# Patient Record
Sex: FEMALE | Race: WHITE | NOT HISPANIC OR LATINO | Employment: FULL TIME | URBAN - METROPOLITAN AREA
[De-identification: names, ages, dates, MRNs, and addresses within clinical notes are randomized per-mention and may not be internally consistent; named-entity substitution may affect disease eponyms.]

---

## 2024-09-13 ENCOUNTER — OFFICE VISIT (OUTPATIENT)
Dept: ORTHOPEDIC SURGERY | Facility: CLINIC | Age: 39
End: 2024-09-13
Payer: COMMERCIAL

## 2024-09-13 VITALS — WEIGHT: 123.6 LBS | TEMPERATURE: 97.8 F | BODY MASS INDEX: 22.74 KG/M2 | HEIGHT: 62 IN

## 2024-09-13 DIAGNOSIS — R52 PAIN: ICD-10-CM

## 2024-09-13 DIAGNOSIS — M54.12 CERVICAL RADICULOPATHY: Primary | ICD-10-CM

## 2024-09-13 RX ORDER — AMPHETAMINE 15.7 MG/1
TABLET, ORALLY DISINTEGRATING ORAL
COMMUNITY
Start: 2024-08-28

## 2024-09-13 NOTE — PROGRESS NOTES
Patient Name: Armando Parra   YOB: 1985  Referring Primary Care Physician: Jen Zaragoza APRN      Chief Complaint:    Chief Complaint   Patient presents with    Cervical Spine - Initial Evaluation, Pain        Previous Treatment:     PT for neck/shoulder pain? Yes    Effective? No    MRI of C-Spine? No         Neck Pain   This is a chronic problem. The current episode started more than 1 year ago. The problem occurs constantly. The problem has been gradually worsening. The pain is associated with nothing. The pain is present in the right side and occipital region (Rt shoulder). The pain is at a severity of 4/10. The pain is moderate. Nothing aggravates the symptoms. Associated symptoms include numbness, tingling and weakness. She has tried chiropractic manipulation for the symptoms. The treatment provided mild relief.        HPI:  Armando Parra is a 39 y.o. female who presents to [facility] for evaluation of above complaints.  Primarily having pain in the right side of her neck into the shoulder occasionally into the triceps as well.  Also has paresthesia in the entire right hand.  She is right-hand dominant.  She was previously evaluated by an orthopedist for right shoulder pain and felt to have biceps tendinopathy.  She did complete a round of physical therapy for her neck and shoulder.  Unfortunately symptoms have progressed over the years.  Also has secondary complaint of low back pain referring down the right lower extremity.  Denies any associated injury with the neck or back.  She does have a sense of imbalance but feels like she is just generally incoordinated.  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  This is a new patient to the practice and new to me.  She is unaccompanied today.  Prior pertinent records were reviewed.    PFSH:  See attached    ROS: As per HPI, otherwise  negative    Objective:      39 y.o. female  Body mass index is 22.61 kg/m²., 56.1 kg (123 lb 9.6 oz), @HT@  Vitals:    09/13/24 0909   Temp: 97.8 °F (36.6 °C)     Pain Score    09/13/24 0909   PainSc:   4   PainLoc: Neck            Spine Musculoskeletal Exam    Gait    Gait is normal.    Inspection    Coronal balance: no imbalance    Sagittal balance: no imbalance    Palpation    Cervical Spine    Right      Muscle tone: normal    Left      Muscle tone: normal    Strength    Cervical Spine    Cervical spine motor exam is normal.    Sensory    Cervical Spine    Cervical spine sensation is normal.    Reflexes    Right        Biceps: 2/4      Brachioradialis: 2/4      Triceps: 2/4      Puga: present (very subtle)    Left        Biceps: 2/4        Brachioradialis: 2/4      Triceps: 2/4      Puga: present        IMAGING:     Indication: pain related symptoms,  Views: 2V AP&LAT cervical    Findings: straightening of lordosis, disc space settling worse at C5C6  Comparison views: None for direct comparison but report in 2018 reported as normal    Assessment:           Diagnoses and all orders for this visit:    1. Cervical radiculopathy (Primary)  -     MRI Cervical Spine Without Contrast; Future    2. Pain  -     XR Spine Cervical 2 View             Plan:  She does not seem to have a radicular component when it comes to the right shoulder and arm.  She does have advanced degenerative change C5-C6 as well as subtle bilateral Robin's.  She has already tried a course of physical therapy but symptoms have progressed.  Will submit for cervical MRI and see her back afterwards to discuss the results and possibly workup the low back and right leg complaints at that point.  We did discuss the possibility of Erler's Danlos syndrome today, there is really no good treatment other than staying active and exercising.     Return for After radiographic tests.    EMR Dragon/Transcription Disclaimer:   Much of this encounter note  is an electronic transcription/translation of spoken language to printed text. The electronic translation of spoken language may permit erroneous, or at times, nonsensical words or phrases to be inadvertently transcribed; Although I have reviewed the note for such errors, some may still exist.  Red flags have been discussed at this or previous visits to include but not limited weakness in extremities, worsening pain that does not respond to conservative treatment and bowel or bladder dysfunction. These are reasons to present to ER and patient has been informed.    The diagnosis(es), natural history, pathophysiology and treatment for diagnosis(es) were discussed. Opportunity given and questions answered. Biomechanics of pertinent body areas discussed.    EXERCISES:  Advice on benefits of, and types of regular/moderate exercise pertaining to diagnosis.  Continue HEP. For back or neck pain, recommend pilates and or yoga as tolerated. Generally it is best to start any new exercise under the guidance of a  or therapist.   MEDICATIONS:  When prescribe, the risks, benefits, warnings,side effects and alternatives of medications discussed. Advised against long term use of narcotics.   PAIN CONTROL:  Cold, heat, OTC lidocaine patches and/or ointment as needed. Avoid direct skin contact with ice. Ice 15-20 minutes 3-4 times daily as needed. For SI joint pain, recommend ice bath in water about 50 degrees for 5 consecutive days, add ice slowly to help with adjustment and may cover with warm towel or robe to help with cold tolerance. If using lidocaine, do not apply heat in conjunction as this can cause a burn.   MEDICAL RECORDS reviewed from other provider(s) for past and current medical history pertinent to this visit..

## 2024-09-26 ENCOUNTER — HOSPITAL ENCOUNTER (OUTPATIENT)
Dept: MRI IMAGING | Facility: HOSPITAL | Age: 39
Discharge: HOME | End: 2024-09-26
Admitting: NURSE PRACTITIONER
Payer: COMMERCIAL

## 2024-09-26 DIAGNOSIS — M54.12 CERVICAL RADICULOPATHY: ICD-10-CM

## 2024-09-26 PROCEDURE — 72141 MRI NECK SPINE W/O DYE: CPT

## 2024-10-07 ENCOUNTER — OFFICE VISIT (OUTPATIENT)
Dept: ORTHOPEDIC SURGERY | Facility: CLINIC | Age: 39
End: 2024-10-07
Payer: COMMERCIAL

## 2024-10-07 VITALS — WEIGHT: 127.6 LBS | BODY MASS INDEX: 23.48 KG/M2 | HEIGHT: 62 IN | TEMPERATURE: 97.3 F

## 2024-10-07 DIAGNOSIS — M54.12 CERVICAL RADICULOPATHY: Primary | ICD-10-CM

## 2024-10-07 DIAGNOSIS — M54.16 LUMBAR RADICULOPATHY: ICD-10-CM

## 2024-10-07 PROCEDURE — 99213 OFFICE O/P EST LOW 20 MIN: CPT | Performed by: NURSE PRACTITIONER

## 2024-10-07 NOTE — PROGRESS NOTES
Patient Name: Armando Parra   YOB: 1985  Referring Primary Care Physician: Jen Zaragoza APRN      Chief Complaint:    Chief Complaint   Patient presents with    Cervical Spine - Follow-up, Pain            HPI:  Armando Parra is a 39 y.o. female who presents to [facility] for follow-up of cervical MRI for primary complaint of neck pain referring into the right shoulder and paresthesias in the right hand.  No change in symptoms since last visit.  Secondary complaint of back pain referring to the right lower extremity.    PFSH:  See attached    ROS: As per HPI, otherwise negative    Objective:      39 y.o. female  Body mass index is 23.34 kg/m²., 57.9 kg (127 lb 9.6 oz), @HT@  Vitals:    10/07/24 0838   Temp: 97.3 °F (36.3 °C)     Pain Score    10/07/24 0838   PainSc:   4   PainLoc: Neck            Spine Musculoskeletal Exam    Gait    Gait is normal.    Inspection    Coronal balance: no imbalance    Sagittal balance: no imbalance    Palpation    Thoracolumbar    Tenderness: present      SI Joint: right    Strength    Thoracolumbar    Thoracolumbar motor exam is normal.       Sensory    Thoracolumbar    Thoracolumbar sensation is normal.    Reflexes    Right      Quadriceps: 2/4      Achilles: 2/4     Left      Quadriceps: 2/4      Achilles: 2/4        IMAGING:       No imaging in office today.  Cervical MRI was reviewed with patient reveals degenerative change most pronounced to the right at C5-6 contributing to moderate to severe right foraminal narrowing.  Agree with report.  Assessment:           Diagnoses and all orders for this visit:    1. Cervical radiculopathy (Primary)  -     Ambulatory Referral to Pain Management Clinic    2. Lumbar radiculopathy  -     Ambulatory Referral to Pain Management Clinic  -     MRI Lumbar Spine Without Contrast; Future             Plan:  Arm symptoms could be explained by the foraminal narrowing to the right at C5-6.  As discussed  at last visit, would refer to pain management to discuss injection options.  She has secondary complaint of back pain referring to the right lower extremity.  She has been through therapy in the past without significant relief.  Pain is primarily right low back and upper buttock.  She does have pain upon palpation the right SI joint.  Will rule out nerve root impingement with lumbar MRI.  With the referral for the cervical epidural injections to Dr. Lisa, also advised that she also be a good candidate for lumbar epidural versus right SI joint injections.  Will call her with the results of the lumbar MRI.  No loss of nerve function on exam today so not likely to lead to surgical intervention in the short-term so we will follow-up as needed.    Return if symptoms worsen or fail to improve.    EMR Dragon/Transcription Disclaimer:   Much of this encounter note is an electronic transcription/translation of spoken language to printed text. The electronic translation of spoken language may permit erroneous, or at times, nonsensical words or phrases to be inadvertently transcribed; Although I have reviewed the note for such errors, some may still exist.  Red flags have been discussed at this or previous visits to include but not limited weakness in extremities, worsening pain that does not respond to conservative treatment and bowel or bladder dysfunction. These are reasons to present to ER and patient has been informed.    The diagnosis(es), natural history, pathophysiology and treatment for diagnosis(es) were discussed. Opportunity given and questions answered. Biomechanics of pertinent body areas discussed.    EXERCISES:  Advice on benefits of, and types of regular/moderate exercise pertaining to diagnosis.  Continue HEP. For back or neck pain, recommend pilates and or yoga as tolerated. Generally it is best to start any new exercise under the guidance of a  or therapist.   MEDICATIONS:  When prescribe, the risks,  benefits, warnings,side effects and alternatives of medications discussed. Advised against long term use of narcotics.   PAIN CONTROL:  Cold, heat, OTC lidocaine patches and/or ointment as needed. Avoid direct skin contact with ice. Ice 15-20 minutes 3-4 times daily as needed. For SI joint pain, recommend ice bath in water about 50 degrees for 5 consecutive days, add ice slowly to help with adjustment and may cover with warm towel or robe to help with cold tolerance. If using lidocaine, do not apply heat in conjunction as this can cause a burn.   MEDICAL RECORDS reviewed from other provider(s) for past and current medical history pertinent to this visit..   Normal for race

## 2024-10-29 ENCOUNTER — HOSPITAL ENCOUNTER (OUTPATIENT)
Dept: MRI IMAGING | Facility: HOSPITAL | Age: 39
Discharge: HOME | End: 2024-10-29
Admitting: NURSE PRACTITIONER
Payer: COMMERCIAL

## 2024-10-29 DIAGNOSIS — M54.16 LUMBAR RADICULOPATHY: ICD-10-CM

## 2024-10-29 PROCEDURE — 72148 MRI LUMBAR SPINE W/O DYE: CPT

## 2024-11-01 DIAGNOSIS — M46.1 SACROILIAC INFLAMMATION: Primary | ICD-10-CM

## 2024-11-18 ENCOUNTER — TELEPHONE (OUTPATIENT)
Dept: ORTHOPEDIC SURGERY | Facility: CLINIC | Age: 39
End: 2024-11-18

## 2024-11-18 NOTE — TELEPHONE ENCOUNTER
"I called pt to get pt scheduled for tomorrow; but no answer I left VM for pt to call office back.     Relay     \"Per Padmini patient can come in tomorrow morning at 8:20 am, 8:40 am or 9:40 am. Or tomorrow afternoon at 2pm\"                 "

## 2024-11-18 NOTE — TELEPHONE ENCOUNTER
Name: Armando Parra      Relationship: Self      Best Callback Number: [phone]      HUB PROVIDED THE RELAY MESSAGE FROM THE OFFICE      PATIENT: SCHEDULED PER NOTE    ADDITIONAL INFORMATION: N/A

## 2024-11-18 NOTE — TELEPHONE ENCOUNTER
Caller: Armando Parra    Relationship to patient: Self    Best call back number:    Chief complaint: NECK PAIN     Type of visit: FUP    Requested date: 11/19/24 OR 11/20/24     Additional notes:UNABLE TO WARM TRANSFER.  PATIENT WANTED TO SEE IF SHE COULD BE SEEN TOMORROW OR WEDNESDAY FOR NEXCK PAIN FUP

## 2024-11-19 ENCOUNTER — OFFICE VISIT (OUTPATIENT)
Dept: ORTHOPEDIC SURGERY | Facility: CLINIC | Age: 39
End: 2024-11-19
Payer: COMMERCIAL

## 2024-11-19 VITALS — TEMPERATURE: 98.2 F | HEIGHT: 62 IN | BODY MASS INDEX: 22.31 KG/M2 | WEIGHT: 121.2 LBS

## 2024-11-19 DIAGNOSIS — G89.29 CHRONIC NECK PAIN: ICD-10-CM

## 2024-11-19 DIAGNOSIS — M54.41 CHRONIC LOW BACK PAIN WITH RIGHT-SIDED SCIATICA, UNSPECIFIED BACK PAIN LATERALITY: ICD-10-CM

## 2024-11-19 DIAGNOSIS — G89.29 CHRONIC LOW BACK PAIN WITH RIGHT-SIDED SCIATICA, UNSPECIFIED BACK PAIN LATERALITY: ICD-10-CM

## 2024-11-19 DIAGNOSIS — M25.511 CHRONIC RIGHT SHOULDER PAIN: ICD-10-CM

## 2024-11-19 DIAGNOSIS — M54.2 CHRONIC NECK PAIN: ICD-10-CM

## 2024-11-19 DIAGNOSIS — G89.29 CHRONIC RIGHT SHOULDER PAIN: ICD-10-CM

## 2024-11-19 PROCEDURE — 99214 OFFICE O/P EST MOD 30 MIN: CPT | Performed by: NURSE PRACTITIONER

## 2024-11-19 RX ORDER — GABAPENTIN 300 MG/1
1 CAPSULE ORAL 3 TIMES DAILY
COMMUNITY
Start: 2024-10-21

## 2024-11-19 RX ORDER — METHYLPREDNISOLONE 4 MG/1
TABLET ORAL
Qty: 21 TABLET | Refills: 0 | Status: SHIPPED | OUTPATIENT
Start: 2024-11-19 | End: 2024-11-21

## 2024-11-19 NOTE — PROGRESS NOTES
Patient Name: Armando Parra   YOB: 1985  Referring Primary Care Physician: Jen Zaragoza APRN      Chief Complaint:    Chief Complaint   Patient presents with    Lumbar Spine - Follow-up, Pain          HPI:  Armando Parra is a 39 y.o. female who presents to [facility] for follow-up with primary complaint of low back pain.  Has been seen in the past for both neck and back pain.  Since last visit she had what sounds like a cervical epidural injection with Dr. Lisa.  She has followed up with him.  She says they discussed medication management as she says the neck and back pain are quite life limiting.  She says the back pain feels like an intense pressure through the low back and upper buttocks.  She says she had to stay in a hot shower for 2-1/2 hours recently just to be able to tolerate the symptoms.  She says she is even tender to palpate the skin over the low back and upper buttocks.  She cannot stand or sit for any length of time.  She is scheduled for lumbar injection in the next few weeks, sounds like an epidural.    PFSH:  See attached    ROS: As per HPI, otherwise negative    Objective:      39 y.o. female  Body mass index is 22.17 kg/m²., 55 kg (121 lb 3.2 oz), @HT@  Vitals:    11/19/24 1427   Temp: 98.2 °F (36.8 °C)     Pain Score    11/19/24 1427   PainSc:   8   PainLoc: Back            Spine Musculoskeletal Exam    Gait    Gait is normal.    Palpation    Thoracolumbar    Tenderness: present      Paraspinous: right and left      SI Joint: right and left    Right      Muscle tone: normal    Left      Muscle tone: normal    Sensory    Thoracolumbar    Thoracolumbar sensation is normal.    General      Constitutional: well-developed and well-nourished    Scleral icterus: no    Labored breathing: no    Psychiatric: normal mood and affect and no acute distress    Neurological: alert and oriented x3        IMAGING:   No new imaging in office  today    Assessment:           Diagnoses and all orders for this visit:    1. Chronic low back pain with right-sided sciatica, unspecified back pain laterality    2. Chronic neck pain    3. Chronic right shoulder pain    Other orders  -     methylPREDNISolone (MEDROL) 4 MG dose pack; Use as directed by package instructions  Dispense: 21 tablet; Refill: 0             Plan:  Unfortunately, there is not much else we can do while awaiting pain management to complete a series of injections.  Based on her MRI and exam findings, she may be a better candidate for SI joint injections and if not beneficial, medial branch block/ablation.  Back pain is more life limiting than any leg symptoms and she does not have any nerve root impingement on lumbar MRI.  She was prescribed gabapentin by pain management and had only been taking 1 at night until recently when she increased to 2 or 3 times daily over the past few days.  She has not noticed much benefit.  We discussed the general recommendation to slowly increase the dose to avoid side effects.  She says she will give this a try over the next few weeks.  She also is going on vacation next week and wanted to discuss other medication management.  She understands that since she has a contract with pain management, we cannot offer any other medications other than a Medrol Dosepak.  She tolerated this in August, but we did discuss common side effects.  She does not directly ask for any narcotics today, but again that is up to pain management to decide appropriate medication management for her symptoms.  She will follow-up after injections if she does not gain relief and at that point, depending whether her pain seems more SI joint mediated will consider CT of the pelvis and referral to Dr. Larsen to discuss surgical options.  We did discuss today that there really no good options from the surgery perspective for back pain, although she may be a candidate for SI joint fusion if  injections only offer temporary relief.      Return if symptoms worsen or fail to improve.    EMR Dragon/Transcription Disclaimer:   Much of this encounter note is an electronic transcription/translation of spoken language to printed text. The electronic translation of spoken language may permit erroneous, or at times, nonsensical words or phrases to be inadvertently transcribed; Although I have reviewed the note for such errors, some may still exist.  Red flags have been discussed at this or previous visits to include but not limited weakness in extremities, worsening pain that does not respond to conservative treatment and bowel or bladder dysfunction. These are reasons to present to ER and patient has been informed.    The diagnosis(es), natural history, pathophysiology and treatment for diagnosis(es) were discussed. Opportunity given and questions answered. Biomechanics of pertinent body areas discussed.    EXERCISES:  Advice on benefits of, and types of regular/moderate exercise pertaining to diagnosis.  Continue HEP. For back or neck pain, recommend pilates and or yoga as tolerated. Generally it is best to start any new exercise under the guidance of a  or therapist.   MEDICATIONS:  When prescribe, the risks, benefits, warnings,side effects and alternatives of medications discussed. Advised against long term use of narcotics.   PAIN CONTROL:  Cold, heat, OTC lidocaine patches and/or ointment as needed. Avoid direct skin contact with ice. Ice 15-20 minutes 3-4 times daily as needed. For SI joint pain, recommend ice bath in water about 50 degrees for 5 consecutive days, add ice slowly to help with adjustment and may cover with warm towel or robe to help with cold tolerance. If using lidocaine, do not apply heat in conjunction as this can cause a burn.   MEDICAL RECORDS reviewed from other provider(s) for past and current medical history pertinent to this visit..

## 2025-01-17 ENCOUNTER — OFFICE VISIT (OUTPATIENT)
Dept: ORTHOPEDIC SURGERY | Facility: CLINIC | Age: 40
End: 2025-01-17
Payer: COMMERCIAL

## 2025-01-17 VITALS — WEIGHT: 127.4 LBS | HEIGHT: 62 IN | TEMPERATURE: 97.7 F | BODY MASS INDEX: 23.45 KG/M2

## 2025-01-17 DIAGNOSIS — M46.1 SACROILIAC INFLAMMATION: Primary | ICD-10-CM

## 2025-01-17 DIAGNOSIS — G89.29 CHRONIC LOW BACK PAIN WITH RIGHT-SIDED SCIATICA, UNSPECIFIED BACK PAIN LATERALITY: ICD-10-CM

## 2025-01-17 DIAGNOSIS — M35.3 POLYMYALGIA: ICD-10-CM

## 2025-01-17 DIAGNOSIS — M25.50 HYPERMOBILITY ARTHRALGIA: ICD-10-CM

## 2025-01-17 DIAGNOSIS — M54.41 CHRONIC LOW BACK PAIN WITH RIGHT-SIDED SCIATICA, UNSPECIFIED BACK PAIN LATERALITY: ICD-10-CM

## 2025-01-17 NOTE — PROGRESS NOTES
Patient Name: Armando Parra   YOB: 1985  Referring Primary Care Physician: Jen Zaragoza APRN      Chief Complaint:    Chief Complaint   Patient presents with    Lumbar Spine - Follow-up, Pain          HPI:  Armando Parra is a 39 y.o. female who presents to [facility] for follow-up after injections with Dr. Lisa.  So far she has had what sounds like cervical and lumbar epidural injections.  We did discuss SI joint injections at last visit, but Dr. Lisa apparently felt an epidural would be more appropriate.  Unfortunately she has not gain relief from any of the injections.  Still complaining primarily of axial neck and back pain but also has the multiple orthopedic complaints of bilateral hips, knees and shoulder pain.  Recall we have also discussed her hypermobility as a possible contributing factor.    PFSH:  See attached    ROS: As per HPI, otherwise negative    Objective:      39 y.o. female  Body mass index is 23.3 kg/m²., 57.8 kg (127 lb 6.4 oz), @HT@  Vitals:    01/17/25 0840   Temp: 97.7 °F (36.5 °C)     Pain Score    01/17/25 0840   PainSc:   5   PainLoc: Back            Spine Musculoskeletal Exam    Gait    Gait is normal.    Strength    Thoracolumbar    Thoracolumbar motor exam is normal.       Sensory    Thoracolumbar    Thoracolumbar sensation is normal.    General      Constitutional: well-developed and well-nourished    Scleral icterus: no    Labored breathing: no    Psychiatric: normal mood and affect and no acute distress    Neurological: alert and oriented x3    Skin: intact        IMAGING:       No new imaging today.    Assessment:           Diagnoses and all orders for this visit:    1. Sacroiliac inflammation (Primary)  -     Ambulatory Referral to Physical Therapy for Evaluation & Treatment    2. Chronic low back pain with right-sided sciatica, unspecified back pain laterality  -     Ambulatory Referral to Physical Medicine Rehab  -      Ambulatory Referral to Physical Therapy for Evaluation & Treatment    3. Hypermobility arthralgia  -     Ambulatory Referral to Rheumatology  -     Ambulatory Referral to Physical Medicine Rehab  -     Ambulatory Referral to Physical Therapy for Evaluation & Treatment    4. Polymyalgia  -     Ambulatory Referral to Rheumatology  -     Ambulatory Referral to Physical Medicine Rehab  -     Ambulatory Referral to Physical Therapy for Evaluation & Treatment             Plan:  She has had a fairly extensive workup for her spine including lumbar and cervical MRIs.  Lumbar MRI showed spondylosis and grade 1 spondylolisthesis L3-4 and L4-5 without significant canal or foraminal narrowing.  Cervical MRI showed retrolisthesis and facet hypertrophy C5-6 with right foraminal narrowing otherwise normal.  She does not have a specific cervical radiculopathy.  Her symptoms are more diffuse in nature.  She does have obvious hypermobility in all of her joints.  She says her knee and shoulders will pop out of place at times.  She would like a definitive diagnosis, leaning towards Erler's Danlos syndrome.  Will refer her to physical medicine rehab for the multiple joint complaints.  She would also like a rheumatology evaluation so order was placed today.  She does report there is rheumatoid arthritis in her family.  Will also go ahead and refer her to physical therapy in the meantime to help work on overall strengthening and advice on how to manage the hypermobility.  Since she did not get relief from the injections, advised against returning to pain management.  She had tried gabapentin as well but really has not noticed much relief so she is currently only taking on a as needed basis.  For now, she can utilize OTC Tylenol or ibuprofen, but we discussed keeping that at a minimum as well to avoid side effects or long-term effects.  She verbalized understanding.  For now we will follow-up as needed.    Return if symptoms worsen or fail  to improve.    EMR Dragon/Transcription Disclaimer:   Much of this encounter note is an electronic transcription/translation of spoken language to printed text. The electronic translation of spoken language may permit erroneous, or at times, nonsensical words or phrases to be inadvertently transcribed; Although I have reviewed the note for such errors, some may still exist.  Red flags have been discussed at this or previous visits to include but not limited weakness in extremities, worsening pain that does not respond to conservative treatment and bowel or bladder dysfunction. These are reasons to present to ER and patient has been informed.    The diagnosis(es), natural history, pathophysiology and treatment for diagnosis(es) were discussed. Opportunity given and questions answered. Biomechanics of pertinent body areas discussed.    EXERCISES:  Advice on benefits of, and types of regular/moderate exercise pertaining to diagnosis.  Continue HEP. For back or neck pain, recommend pilates and or yoga as tolerated. Generally it is best to start any new exercise under the guidance of a  or therapist.   MEDICATIONS:  When prescribe, the risks, benefits, warnings,side effects and alternatives of medications discussed. Advised against long term use of narcotics.   PAIN CONTROL:  Cold, heat, OTC lidocaine patches and/or ointment as needed. Avoid direct skin contact with ice. Ice 15-20 minutes 3-4 times daily as needed. For SI joint pain, recommend ice bath in water about 50 degrees for 5 consecutive days, add ice slowly to help with adjustment and may cover with warm towel or robe to help with cold tolerance. If using lidocaine, do not apply heat in conjunction as this can cause a burn.   MEDICAL RECORDS reviewed from other provider(s) for past and current medical history pertinent to this visit..

## 2025-01-24 ENCOUNTER — TELEPHONE (OUTPATIENT)
Dept: ORTHOPEDIC SURGERY | Facility: CLINIC | Age: 40
End: 2025-01-24
Payer: COMMERCIAL

## 2025-01-24 ENCOUNTER — TREATMENT (OUTPATIENT)
Dept: PHYSICAL THERAPY | Facility: CLINIC | Age: 40
End: 2025-01-24
Payer: COMMERCIAL

## 2025-01-24 DIAGNOSIS — M46.1 SACROILIAC INFLAMMATION: Primary | ICD-10-CM

## 2025-01-24 DIAGNOSIS — R53.1 WEAKNESS: ICD-10-CM

## 2025-01-24 DIAGNOSIS — M25.651 HIP JOINT STIFFNESS, BILATERAL: ICD-10-CM

## 2025-01-24 DIAGNOSIS — M54.41 CHRONIC LOW BACK PAIN WITH RIGHT-SIDED SCIATICA, UNSPECIFIED BACK PAIN LATERALITY: ICD-10-CM

## 2025-01-24 DIAGNOSIS — G89.29 CHRONIC LOW BACK PAIN WITH RIGHT-SIDED SCIATICA, UNSPECIFIED BACK PAIN LATERALITY: ICD-10-CM

## 2025-01-24 DIAGNOSIS — M25.50 HYPERMOBILITY ARTHRALGIA: ICD-10-CM

## 2025-01-24 DIAGNOSIS — M35.3 POLYMYALGIA: Primary | ICD-10-CM

## 2025-01-24 DIAGNOSIS — M35.3 POLYMYALGIA: ICD-10-CM

## 2025-01-24 DIAGNOSIS — R29.3 POSTURE IMBALANCE: ICD-10-CM

## 2025-01-24 DIAGNOSIS — M25.652 HIP JOINT STIFFNESS, BILATERAL: ICD-10-CM

## 2025-01-24 NOTE — TELEPHONE ENCOUNTER
Caller: Armando Parra    Relationship: Self    Best call back number: [phone]    What was the call regarding: PT REQUESTING FOR RHEUMATOLOGY REFERRAL FROM [facility] TO BE SENT TO DR MARYLOU SEWELL WITH [facility]. THEIR FAX NUMBER [phone] FAX AND PHONE NUMBER [phone]. PLEASE CONTACT PT ONCE REFERRAL IS SENT.

## 2025-01-24 NOTE — PROGRESS NOTES
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 Chronic low back pain with right-sided sciatica, unspecified back pain laterality [M54.41, G89.29]  M54.41 724.2    G89.29 724.3     338.29   3. 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 Polymyalgia [M35.3]  M35.3 725   5. Posture imbalance  R29.3 729.90   6. Weakness  R53.1 780.79   7. Hip joint stiffness, bilateral  M25.651 719.55    M25.652               Subjective Evaluation    History of Present Illness  Mechanism of injury: History of pain since high school   Being worked up for EDS   Used to be intermittent symptom but now more constant   History of hyper mobility with subluxations of knee caps and shoulders  WORST in back RIGHT > LEFT   Neck is bad and into RIGHT shoulder   Some level of pain is constant   Will get some numbness in RIGHT hand  POSSIBLE neck and separate shoulder problem  instability   No significant PMH  Gave an ADD meds   Back standing tolerance about 10 min   Can have leg symptom RIGHT or LEFT   Sleep OK most nights but will have exacerbation maybe 1x/month   MRI- Mild multilevel lumbar spondylosis and spondylolisthesis      Subjective comment: back and neck pain with  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 Quality of life: excellent    Pain  Current pain ratin  At best pain ratin  At worst pain ratin  Quality: dull ache, throbbing and radiating  Relieving factors: rest  Aggravating factors: ambulation, repetitive movement, standing,  movement and lifting  Progression: worsening    Social Support  Lives in: multiple-level home  Lives with: spouse and young children    Hand dominance: right    Diagnostic Tests  MRI studies: abnormal             Objective     POSTURE  Forward Head ;    Prominent CT junction;     Increased Kyphosis;    Iliac Crests Level ;    noted knee hyperextension bilaterally   slumped / sway back with poor core activation   ROM   LUMBAR  RIGHT  LEFT  COMMENTS   FLEXION 80% With noted hamstring hypermobility but still good segmental lumbar mobility    Pain at end range all   EXTENSION 100%       SIDE FLEXION   50%  50%    Cervical AROM  100% with tightness at end range   MMT  grossly   OK heel and toe walking   TRANSFERS independent with sit to stand   SINGLE LEG STANCE   10 + sec with RIGHT  trendelfurg  LEFT  trunk SIDE FLEXION    SLR   LEFT  90   RIGHT  80    BRYSON  LEFT  stiff 10%     RIGHT  stiff 50%  with noted pelvic rotation with both   GAIT  forward head   scapular protracted         Functional Outcome Score: Modified Oswestry score is 44% disability with highest reported disability for walking standing and pain is getting worse     HEP   EDUCATION/  SELF care  as to  joint protection   Minimize AROM at end range     SYMPTOM management   Pit stop   lie down 5-10 min hourly       THEREX    Scapular add  1 min   butterfly wings   Supine adductor stretch  BUTTERFLY stretch 1 -3 min     Assessment & Plan       Assessment  Impairments: abnormal gait, abnormal or restricted ROM, activity intolerance, impaired physical strength, lacks appropriate home exercise program and pain with function   Functional limitations: walking, uncomfortable because of pain, sitting, standing and unable to perform repetitive tasks   Assessment details: Armando Parra is a 40 y.o. year-old female referred to physical therapy for neck and back pain . She presents with a evolving clinical presentation.  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  Signs and symptoms are consistent with physical therapy diagnosis of neck and back pain with MRI showing Mild multilevel lumbar spondylosis and spondylolisthesis.   Prognosis: good    Goals  Plan Goals: STG: to be met by 6 weeks  1- Patient will report pain < 2 /10 and able to stand for > 10 min when grocery shopping  2-Patient will increase lumbar AROM to WNL  without compensation or exacerbation to ease with housework   3-Patient will be independent with HEP without compensation or exacerbation   4- Patient will be independent with proper technique with core stabilization   LTG: to be met by 12 weeks  1-Pt will report pain < 2 /10 with recreational activities / caring for children  2-Pt will increase BRYSON to WNL bilaterally  without compensation or exacerbation to ease getting shoes/off   3-Patient will increase strength CORE to 5 /5 to perform closed chain activities /walking 1+ mile without exacerbation of leg or arm symptom and joint protection of spondylolisthesis   4-Pt will be independent with comprehsive land and aquatic HEP   5- Patient will be independent with proper body mechanics for joint protection     Plan  Therapy options: will be seen for skilled therapy services  Planned modality interventions: ultrasound  Planned therapy interventions: transfer training, therapeutic activities, stretching, strengthening, postural training, neuromuscular re-education, home exercise program, gait training, body mechanics training and manual therapy  Other planned therapy interventions: Aquatic therapy  Frequency: 2x week  Duration in weeks: 12  Treatment plan discussed with: patient (Diagnosis and plan of care)  Plan details: DURATION in visits 36        Timed:         Manual Therapy:    0      mins  68916;     Therapeutic Exercise:    15     mins  78686;     Neuromuscular Marita:    0    mins  63394;    Therapeutic Activity:     0     mins  37561;     Gait Trainin     mins  64256;     Ultrasound:     0     mins  10574;    Self Care                       15     mins   58806      Un-Timed:  Electrical Stimulation:    0     mins  43079 ( );  Dry Needling  (1-2 muscles)            0     mins 87397 (Self-pay)  Dry Needling (3-4 muscles) 0     mins 83852 (Self-pay)  Dry Needling Trial    0     mins DRYNDLTRIAL  (No Charge)  Traction     0     mins 48386  Low Eval     0     Mins  47688  Mod Eval     15     Mins  31009  High Eval                       0     Mins  27700    Timed Treatment:   30   mins   Total Treatment:     45   mins    PT SIGNATURE: SABA Stevenson License Number: [id number]    Electronically signed by Elen Fang PT, 25, 8:11 AM EST    DATE TREATMENT INITIATED: 2025    Initial Certification  Certification Period: 2025  I certify that the therapy services are furnished while this patient is under my care.  The services outlined above are required by this patient, and will be reviewed every 90 days.     PHYSICIAN: Padmini Pepe APRN   NPI: [id number]                                         DATE:     Please sign and return via fax to [phone] Thank you, [facility].

## 2025-01-27 ENCOUNTER — TELEPHONE (OUTPATIENT)
Dept: ORTHOPEDIC SURGERY | Facility: CLINIC | Age: 40
End: 2025-01-27
Payer: COMMERCIAL

## 2025-01-27 NOTE — TELEPHONE ENCOUNTER
"Padmini-    Re: Rheumatology Referral (Dr. Huang per patient's request)       Dr. Huang's office has declines referral stating \"Sorry, we do not treat hypermobility\"       Do you have any additional diagnosis we could add to the referral?     Thank you for your help.      "

## 2025-02-19 ENCOUNTER — TELEPHONE (OUTPATIENT)
Dept: PHYSICAL THERAPY | Facility: OTHER | Age: 40
End: 2025-02-19
Payer: COMMERCIAL

## 2025-02-19 NOTE — TELEPHONE ENCOUNTER
Caller: Armando Parra    Relationship: Self    What was the call regarding: PATIENT CANCELLED TODAYS APPT BECAUSE OF THE WEATHER

## 2025-03-12 ENCOUNTER — TELEPHONE (OUTPATIENT)
Dept: PHYSICAL THERAPY | Facility: CLINIC | Age: 40
End: 2025-03-12

## 2025-03-12 NOTE — TELEPHONE ENCOUNTER
Caller: Armando Parra    Relationship: Self      What was the call regarding: REAR ENDED AND CANT MAKE IT ON TIME

## 2025-07-28 ENCOUNTER — OFFICE VISIT (OUTPATIENT)
Dept: ORTHOPEDIC SURGERY | Facility: CLINIC | Age: 40
End: 2025-07-28
Payer: COMMERCIAL

## 2025-07-28 VITALS — BODY MASS INDEX: 24.38 KG/M2 | WEIGHT: 132.5 LBS | HEIGHT: 62 IN | TEMPERATURE: 98.4 F

## 2025-07-28 DIAGNOSIS — M70.51 PES ANSERINUS BURSITIS OF RIGHT KNEE: ICD-10-CM

## 2025-07-28 DIAGNOSIS — R52 PAIN: ICD-10-CM

## 2025-07-28 DIAGNOSIS — M22.2X1 PATELLOFEMORAL PAIN SYNDROME OF RIGHT KNEE: Primary | ICD-10-CM

## 2025-07-28 PROCEDURE — 73564 X-RAY EXAM KNEE 4 OR MORE: CPT | Performed by: ORTHOPAEDIC SURGERY

## 2025-07-28 PROCEDURE — 72170 X-RAY EXAM OF PELVIS: CPT | Performed by: ORTHOPAEDIC SURGERY

## 2025-07-28 PROCEDURE — 99213 OFFICE O/P EST LOW 20 MIN: CPT | Performed by: ORTHOPAEDIC SURGERY

## 2025-07-28 NOTE — PROGRESS NOTES
"General Exam    Patient: Armando Parra    YOB: 1985    Medical Record Number: [id number]    Chief Complaints: Right knee pain    History of Present Illness:     40 y.o. female patient who presents for evaluation right knee pain.  Patient states that she is for years.  She states some days very hard to walk and feels like it gives at times.  She is done PT in the past.  States she has a history where there was some concern for Cristobal-Danlos however she was post to see rheumatology has not done so.  Does have low back issues and is seen and treated for that.  States no recent injury to the knee.  Pain is more medially basically worse with increased activity.    Denies any numbness or tingling.  Denies any fevers, cough or shortness of breath.    Allergies: No Known Allergies    Home Medications:      Current Outpatient Medications:     adZENys XR-ODT 15.7 MG Tablet Extended Release Dispersible, DISSOLVE 1 TABLET BY MOUTH EVERY DAY, Disp: , Rfl:     History reviewed. No pertinent past medical history.    Past Surgical History:   Procedure Laterality Date    WISDOM TOOTH EXTRACTION         Social History     Occupational History    Not on file   Tobacco Use    Smoking status: Never     Passive exposure: Never    Smokeless tobacco: Never   Vaping Use    Vaping status: Never Used   Substance and Sexual Activity    Alcohol use: Not Currently     Comment: not during pregnancy    Drug use: No    Sexual activity: Yes     Partners: Male     Birth control/protection: Vasectomy      Social History     Social History Narrative    Not on file       History reviewed. No pertinent family history.    Review of Systems:      Constitutional: Denies fever, shaking or chills         All other pertinent positives and negatives as noted above in HPI.    Physical Exam: 40 y.o. female    Vitals:    07/28/25 1058   Temp: 98.4 °F (36.9 °C)   TempSrc: Temporal   Weight: 60.1 kg (132 lb 8 oz)   Height: 157.5 cm (62.01\") "       General:  Patient is awake and alert.  Appears in no acute distress or discomfort.      Musculoskeletal/Extremities:    Right knee examined no overt tenderness to palpation along the joint line.  Some minimal tenderness along the Pes anserine area.  Full painless knee range of motion knee stable varus valgus stress.  Negative anterior drawer posterior drawer and Susan.  Generally range of motion tolerated motor and sensory intact distally.  Patient does display some Beighton scoring suggesting hypermobility           Radiology:       Hip and right knee films taken reviewed to evaluate the patient's complaint/s.    AP pelvis shows minimal changes bilateral hip joints with overall preservation without acute fracture noted    Imaging shows overall preservation of joint space.  No acute fractures or osseous abnormalities appreciated.     No imaging for comparison.    Assessment: Right knee patellofemoral syndrome, Pes anserine bursitis      Plan:      Discussed findings with the patient.  I feel some of the patellofemoral joint may be involved especially given her hypermobility and history.  Also gait abnormality due to low back issues could be driving some of her lower extremity discomfort.  Does have some discomfort about soft tissues around the knee as noted above.  This time recommend over-the-counter topical medication, rest, ice and formal physical therapy with modalities as indicated.  If patient does not feel things are improved neck 6 to 8 weeks may return for further evaluation.  All questions answered.           We will plan for follow up as above.    All questions were answered.  Patient understands and agrees with the plan.    Merritt Rojas MD    07/28/2025    CC to Jen Zaragoza APRN      Disclaimer: Please note that areas of this note were completed with computer voice recognition software.  Quite often unanticipated grammatical, syntax, homophones, and other interpretive errors are  inadvertently transcribed by the computer software. Please excuse any errors that have escaped final proofreading.